# Patient Record
Sex: MALE | URBAN - METROPOLITAN AREA
[De-identification: names, ages, dates, MRNs, and addresses within clinical notes are randomized per-mention and may not be internally consistent; named-entity substitution may affect disease eponyms.]

---

## 2019-06-21 ENCOUNTER — TRANSFERRED RECORDS (OUTPATIENT)
Dept: HEALTH INFORMATION MANAGEMENT | Facility: CLINIC | Age: 84
End: 2019-06-21

## 2019-07-02 ENCOUNTER — HOSPITAL ENCOUNTER (OUTPATIENT)
Dept: PHYSICAL THERAPY | Facility: CLINIC | Age: 84
Setting detail: THERAPIES SERIES
End: 2019-07-02
Attending: STUDENT IN AN ORGANIZED HEALTH CARE EDUCATION/TRAINING PROGRAM
Payer: MEDICARE

## 2019-07-02 PROCEDURE — 97112 NEUROMUSCULAR REEDUCATION: CPT | Mod: GP | Performed by: PHYSICAL THERAPIST

## 2019-07-02 PROCEDURE — 97162 PT EVAL MOD COMPLEX 30 MIN: CPT | Mod: GP | Performed by: PHYSICAL THERAPIST

## 2019-07-02 NOTE — PROGRESS NOTES
"   07/02/19 1500   Quick Adds   Quick Adds Certification;Vestibular Eval   Type of Visit Initial OP PT Evaluation   General Information   Start of Care Date 07/02/19   Referring Physician Emanuel Wen MD      Orders Evaluate and Treat as Indicated   Order Date 06/21/19   Medical Diagnosis vertigo   Surgical/Medical history reviewed Yes   Pertinent history of current problem (include personal factors and/or comorbidities that impact the POC) The pt reports that he has been feeling unsteady the past year but has been worse the past few weeks. The pt feels more comfortable standing near something. Has had 3 falls in the past year. Has not been using an AD. No reports of N/T. The pt reports that he is less active recently. Reports that this could also be due to LBP. Does not describe symptoms as \"dizzy\". 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 Yes   Have you fallen and had an injury in the past year? Yes   Timed Up and Go score (seconds) 8.3   Is patient a fall risk? Yes   Fall screen comments per DGI, mild   Pain   Pain comments mild back pain   Cognitive Status Examination   Orientation orientation to person, place and time   Integumentary   Integumentary No deficits were identified   Posture   Posture Forward head position   Range of Motion (ROM)   ROM Comment B UE and LEs WFLs   Strength   Strength Comments B hip flexion 4/5, knee extension 5/5, dosriflexion 5/5 "   Transfer Skills   Transfer Comments Able without use of UEs   Gait   Gait Comments Ambulates with slightly forward flexed posture, eyes stay toward ground, achieves heel strike B   Gait Special Tests   Gait Special Tests DYNAMIC GAIT INDEX   Gait Special Tests Dynamic Gait Index   Score out of 24 19   Balance Special Tests   Balance Special Tests Sit to stand reps;Modified CTSIB Conditions   Balance Special Tests Modified CTSIB Conditions   Condition 1, seconds 30 Seconds   Condition 2, seconds 20 Seconds   Condition 4, seconds 30 Seconds   Condition 5, seconds 0 Seconds   Modified CTSIB Comments vision dominant   Balance Special Tests Sit to Stand Reps in 30 Seconds   Reps in 30 seconds 8   Sensory Examination   Sensory Perception no deficits were identified   Coordination   Coordination no deficits were identified   Oculomotor Exam   Smooth Pursuit Normal   Saccades Normal   VOR Normal   Dynamic Visual Acuity (DVA)   Static Acuity (LogMar) .4   Horizontal Head Movement at 1 Hz (LogMar) .5   Horizontal Head Movement at 2 Hz (LogMar) .5   DVA Comments no increase in symptoms   Planned Therapy Interventions   Planned Therapy Interventions balance training;neuromuscular re-education   Clinical Impression   Criteria for Skilled Therapeutic Interventions Met yes, treatment indicated   PT Diagnosis impaired balance   Influenced by the following impairments instability, mild weakness   Functional limitations due to impairments difficulty with static and dynamic balance, functional weakness with trasners, difficulty ambulating in community   Clinical Presentation Evolving/Changing   Clinical Presentation Rationale increase in number of falls   Clinical Decision Making (Complexity) Moderate complexity   Therapy Frequency 1 time/week   Predicted Duration of Therapy Intervention (days/wks) 1 visit   Risk & Benefits of therapy have been explained Yes   Patient, Family & other staff in agreement with plan of care Yes    Clinical Impression Comments The pt is wanting to practice independently at this time with a HEP. Will request additional order from MD as needed.   Education Assessment   Preferred Learning Style Pictures/video   Barriers to Learning No barriers   GOALS   PT Eval Goals 1   Goal 1   Goal Identifier HEP   Goal Description The pt will demonstrate safey and independence with a HEP of 3 exercises to focus on reducing fall risk   Target Date 07/02/19   Date Met 07/02/19   Total Evaluation Time   PT Eval, Moderate Complexity Minutes (94405) 40   Therapy Certification   Certification date from 07/02/19   Certification date to 07/02/19   Medical Diagnosis vertigo   Certification I certify the need for these services furnished under this plan of treatment and while under my care.  (Physician co-signature of this document indicates review and certification of the therapy plan).

## 2019-07-02 NOTE — PROGRESS NOTES
[facility]        OUTPATIENT PHYSICAL THERAPY FUNCTIONAL EVALUATION  PLAN OF TREATMENT FOR OUTPATIENT REHABILITATION  (COMPLETE FOR INITIAL CLAIMS ONLY)  Patient's Last Name, First Name, M.I.  YOB: 1927  Alonzo Henderson        Provider's Name   [facility]   Medical Record No.  [id number]     Start of Care Date:  07/02/19   Onset Date:   6/21/19   Type:     _X__PT   ____OT  ____SLP Medical Diagnosis:  vertigo     PT Diagnosis:  impaired balance Visits from SOC:  1                              __________________________________________________________________________________  Plan of Treatment/Functional Goals:  balance training, neuromuscular re-education           GOALS  HEP  The pt will demonstrate safey and independence with a HEP of 3 exercises to focus on reducing fall risk  07/02/19  MET                Therapy Frequency:  1 time/week   Predicted Duration of Therapy Intervention:  1 visit    Carrie Harrington, PT                                    I CERTIFY THE NEED FOR THESE SERVICES FURNISHED UNDER        THIS PLAN OF TREATMENT AND WHILE UNDER MY CARE .             Physician Signature               Date    X_____________________________________________________                      Certification Date From:  07/02/19   Certification Date To:  07/02/19    Referring Provider:  Emanuel Wen MD       Initial Assessment  See Epic Evaluation- Start of Care Date: 07/02/19